# Patient Record
Sex: FEMALE | Race: BLACK OR AFRICAN AMERICAN | NOT HISPANIC OR LATINO | Employment: OTHER | URBAN - METROPOLITAN AREA
[De-identification: names, ages, dates, MRNs, and addresses within clinical notes are randomized per-mention and may not be internally consistent; named-entity substitution may affect disease eponyms.]

---

## 2024-03-11 ENCOUNTER — OFFICE VISIT (OUTPATIENT)
Dept: OBSTETRICS AND GYNECOLOGY | Facility: CLINIC | Age: 69
End: 2024-03-11
Payer: MEDICARE

## 2024-03-11 VITALS
HEIGHT: 69 IN | BODY MASS INDEX: 39.69 KG/M2 | DIASTOLIC BLOOD PRESSURE: 84 MMHG | SYSTOLIC BLOOD PRESSURE: 126 MMHG | WEIGHT: 268 LBS

## 2024-03-11 DIAGNOSIS — Z01.419 ENCOUNTER FOR ANNUAL ROUTINE GYNECOLOGICAL EXAMINATION: Primary | ICD-10-CM

## 2024-03-11 DIAGNOSIS — B37.31 YEAST VAGINITIS: ICD-10-CM

## 2024-03-11 DIAGNOSIS — Z12.31 BREAST CANCER SCREENING BY MAMMOGRAM: ICD-10-CM

## 2024-03-11 DIAGNOSIS — R21 SKIN RASH: ICD-10-CM

## 2024-03-11 DIAGNOSIS — Z12.4 PAP SMEAR FOR CERVICAL CANCER SCREENING: ICD-10-CM

## 2024-03-11 PROCEDURE — 88175 CYTOPATH C/V AUTO FLUID REDO: CPT | Performed by: OBSTETRICS & GYNECOLOGY

## 2024-03-11 PROCEDURE — G0101 CA SCREEN;PELVIC/BREAST EXAM: HCPCS | Mod: S$GLB,,, | Performed by: OBSTETRICS & GYNECOLOGY

## 2024-03-11 RX ORDER — SIMVASTATIN 40 MG/1
40 TABLET, FILM COATED ORAL NIGHTLY
COMMUNITY
Start: 2024-02-24

## 2024-03-11 RX ORDER — HYDROCHLOROTHIAZIDE 25 MG/1
25 TABLET ORAL
COMMUNITY
Start: 2023-12-04

## 2024-03-11 RX ORDER — LOSARTAN POTASSIUM 100 MG/1
100 TABLET ORAL
COMMUNITY
Start: 2023-12-04

## 2024-03-11 RX ORDER — CLOTRIMAZOLE AND BETAMETHASONE DIPROPIONATE 10; .64 MG/G; MG/G
CREAM TOPICAL 2 TIMES DAILY
Qty: 45 G | Refills: 2 | Status: SHIPPED | OUTPATIENT
Start: 2024-03-11

## 2024-03-11 RX ORDER — FLUCONAZOLE 150 MG/1
150 TABLET ORAL DAILY PRN
Qty: 3 TABLET | Refills: 3 | Status: SHIPPED | OUTPATIENT
Start: 2024-03-11 | End: 2024-03-14

## 2024-03-11 NOTE — PROGRESS NOTES
"Medicare Breast and Pelvic    HPI:  Viola Clark is a 68 y.o. female  presents for a well woman exam.  LMP: No LMP recorded. Patient is postmenopausal..   She complains of an itchy rash under her breasts and in her groins and under her pannus  She denies any bleeding    Past Medical History:   Diagnosis Date    Hyperlipidemia     Hypertension     Unspecified glaucoma      Past Surgical History:   Procedure Laterality Date    TOTAL KNEE REPLACEMENT USING COMPUTER NAVIGATION Right      Social History     Socioeconomic History    Marital status: Single   Tobacco Use    Smoking status: Never    Smokeless tobacco: Never   Substance and Sexual Activity    Alcohol use: Yes     Comment: occ    Sexual activity: Not Currently     Family History   Problem Relation Age of Onset    Colon cancer Mother     Breast cancer Neg Hx     Ovarian cancer Neg Hx     Uterine cancer Neg Hx      OB History          0    Para   0    Term   0       0    AB   0    Living   0         SAB   0    IAB   0    Ectopic   0    Multiple   0    Live Births   0                 /84 (BP Location: Right arm, Patient Position: Sitting)   Ht 5' 9" (1.753 m)   Wt 121.6 kg (268 lb)   BMI 39.58 kg/m²     ROS:  GENERAL: Denies weight gain or weight loss. Feeling well overall.   SKIN:  See HPI   HEAD: Denies head injury or headache.   NODES: Denies enlarged lymph nodes.   CHEST: Denies chest pain or shortness of breath.   CARDIOVASCULAR: Denies palpitations or left sided chest pain.   ABDOMEN: No abdominal pain, constipation, diarrhea, nausea, vomiting or rectal bleeding.   URINARY: No frequency, dysuria, hematuria, or burning on urination.  REPRODUCTIVE: See HPI.   BREASTS: The patient performs breast self-examination and denies pain, lumps, or nipple discharge.   HEMATOLOGIC: No easy bruisability or excessive bleeding.   MUSCULOSKELETAL: Denies joint pain or swelling.   NEUROLOGIC: Denies syncope or weakness. "   PSYCHIATRIC: Denies depression, anxiety or mood swings.    PHYSICAL EXAM:    APPEARANCE: Well nourished, well developed, in no acute distress.  AFFECT: WNL, alert and oriented x 3  SKIN:  Fungal rash under the breasts and in the groins  NECK: Neck symmetric without masses or thyromegaly  NODES: No inguinal, cervical, axillary, or femoral lymph node enlargement  CHEST: Good respiratory effect  ABDOMEN: Soft.  No tenderness or masses.  No hepatosplenomegaly.  No hernias.  BREASTS: Symmetrical, no skin changes or visible lesions.  No palpable masses, nipple discharge bilaterally.  PELVIC:     Pelvic  Vv normal mucosa,  small amount of discharge consistent with yeast  Cervix clear  Uterus anteverted normal size  Adnexa normal, without masses  Exam limited by habitus      ICD-10-CM ICD-9-CM    1. Encounter for annual routine gynecological examination  Z01.419 V72.31       2. Pap smear for cervical cancer screening  Z12.4 V76.2 Liquid-Based Pap Smear, Screening      Liquid-Based Pap Smear, Screening      3. Breast cancer screening by mammogram  Z12.31 V76.12 Mammo Digital Screening Bilat w/ Payam      Mammo Digital Screening Bilat w/ Payam          Assessment and plan:  1. Pap done  2. Mammogram ordered  3. Colonoscopy is due.  She will contact her GI physician  4. Lotrisone and Diflucan for fungal rashes and yeast vaginitis

## 2024-03-15 LAB
FINAL PATHOLOGIC DIAGNOSIS: NORMAL
Lab: NORMAL

## 2024-03-21 ENCOUNTER — TELEPHONE (OUTPATIENT)
Dept: OBSTETRICS AND GYNECOLOGY | Facility: CLINIC | Age: 69
End: 2024-03-21
Payer: COMMERCIAL

## 2024-03-21 NOTE — TELEPHONE ENCOUNTER
----- Message from Maria sEther Patel sent at 3/21/2024  3:44 PM CDT -----  Contact: PT  Type:  Test Results    Who Called:  PT  Name of Test (Lab/Mammo/Etc):  Pap  Date of Test:  3/11/24  Ordering Provider:  Dr. Gilman  Where the test was performed:  [facility]  Best Call Back Number:  [phone]  Additional Information:  Also have questions regarding what she can take for  vaginal dryness due to yeast infection     
MED/SURG

## 2024-03-25 ENCOUNTER — TELEPHONE (OUTPATIENT)
Dept: OBSTETRICS AND GYNECOLOGY | Facility: CLINIC | Age: 69
End: 2024-03-25
Payer: COMMERCIAL

## 2024-03-25 DIAGNOSIS — N89.8 VAGINAL DRYNESS: Primary | ICD-10-CM

## 2024-03-25 RX ORDER — ESTRADIOL 0.1 MG/G
1 CREAM VAGINAL
Qty: 24 G | Refills: 3 | Status: SHIPPED | OUTPATIENT
Start: 2024-03-25 | End: 2025-03-25

## 2025-02-03 ENCOUNTER — OFFICE VISIT (OUTPATIENT)
Dept: PODIATRY | Facility: CLINIC | Age: 70
End: 2025-02-03
Payer: MEDICARE

## 2025-02-03 VITALS — BODY MASS INDEX: 38.48 KG/M2 | WEIGHT: 259.81 LBS | HEIGHT: 69 IN

## 2025-02-03 DIAGNOSIS — L60.9 DISEASE OF NAIL: ICD-10-CM

## 2025-02-03 DIAGNOSIS — E11.51 TYPE II DIABETES MELLITUS WITH PERIPHERAL CIRCULATORY DISORDER: ICD-10-CM

## 2025-02-03 DIAGNOSIS — M20.41 HAMMER TOES OF BOTH FEET: ICD-10-CM

## 2025-02-03 DIAGNOSIS — M20.11 VALGUS DEFORMITY OF BOTH GREAT TOES: Primary | ICD-10-CM

## 2025-02-03 DIAGNOSIS — E11.9 COMPREHENSIVE DIABETIC FOOT EXAMINATION, TYPE 2 DM, ENCOUNTER FOR: ICD-10-CM

## 2025-02-03 DIAGNOSIS — M20.12 VALGUS DEFORMITY OF BOTH GREAT TOES: Primary | ICD-10-CM

## 2025-02-03 DIAGNOSIS — L84 CORN OR CALLUS: ICD-10-CM

## 2025-02-03 DIAGNOSIS — M20.42 HAMMER TOES OF BOTH FEET: ICD-10-CM

## 2025-02-03 PROCEDURE — 1160F RVW MEDS BY RX/DR IN RCRD: CPT | Mod: CPTII,S$GLB,, | Performed by: PODIATRIST

## 2025-02-03 PROCEDURE — 11721 DEBRIDE NAIL 6 OR MORE: CPT | Mod: 59,Q9,S$GLB, | Performed by: PODIATRIST

## 2025-02-03 PROCEDURE — 1125F AMNT PAIN NOTED PAIN PRSNT: CPT | Mod: CPTII,S$GLB,, | Performed by: PODIATRIST

## 2025-02-03 PROCEDURE — 1159F MED LIST DOCD IN RCRD: CPT | Mod: CPTII,S$GLB,, | Performed by: PODIATRIST

## 2025-02-03 PROCEDURE — 11056 PARNG/CUTG B9 HYPRKR LES 2-4: CPT | Mod: Q9,S$GLB,, | Performed by: PODIATRIST

## 2025-02-03 PROCEDURE — 3008F BODY MASS INDEX DOCD: CPT | Mod: CPTII,S$GLB,, | Performed by: PODIATRIST

## 2025-02-03 PROCEDURE — 99204 OFFICE O/P NEW MOD 45 MIN: CPT | Mod: 25,S$GLB,, | Performed by: PODIATRIST

## 2025-02-03 RX ORDER — ALLOPURINOL 100 MG/1
100 TABLET ORAL
COMMUNITY
Start: 2025-01-23

## 2025-02-03 RX ORDER — BIMATOPROST 0.1 MG/ML
1 SOLUTION/ DROPS OPHTHALMIC
COMMUNITY
Start: 2024-12-31

## 2025-02-03 RX ORDER — NYSTATIN 100000 [USP'U]/G
POWDER TOPICAL
COMMUNITY
Start: 2024-09-17 | End: 2025-09-17

## 2025-02-03 RX ORDER — METFORMIN HYDROCHLORIDE 500 MG/1
500 TABLET, EXTENDED RELEASE ORAL
COMMUNITY
Start: 2024-12-22

## 2025-02-03 RX ORDER — COLCHICINE 0.6 MG/1
TABLET ORAL
COMMUNITY
Start: 2025-01-23

## 2025-02-03 RX ORDER — ERYTHROMYCIN 5 MG/G
OINTMENT OPHTHALMIC
COMMUNITY
Start: 2025-02-01

## 2025-02-03 NOTE — PROGRESS NOTES
"Subjective:     Patient ID: Viola Clark is a 69 y.o. female    Chief Complaint: Foot Pain       Viola is a 69 y.o. female who presents to the clinic for evaluation and treatment of high risk feet. Viola has a past medical history of Hyperlipidemia, Hypertension, and Unspecified glaucoma. The patient's chief complaint is long, thick toenails. This patient has documented high risk feet requiring routine maintenance secondary to diabetes mellitis and those secondary complications of diabetes, as mentioned..  She also complains of tenderness from the right 1st and 2nd digit with noted callus formations.    PCP: April Priest MD    Date Last Seen by PCP:  12/11/2024    Current shoe gear:  Affected Foot: Tennis shoes     Unaffected Foot: Tennis shoes    No results found for: "HGBA1C"    Review of Systems   Constitutional: Negative.  Negative for chills and fever.   Respiratory:  Negative for cough and shortness of breath.    Cardiovascular:  Positive for leg swelling. Negative for chest pain.   Gastrointestinal:  Negative for diarrhea, nausea and vomiting.   Neurological:  Positive for tingling.        Objective:   Physical Exam  Vitals reviewed.   Constitutional:       General: She is not in acute distress.     Appearance: Normal appearance. She is not ill-appearing.   HENT:      Head: Normocephalic.      Nose: Nose normal.   Cardiovascular:      Pulses:           Dorsalis pedis pulses are 2+ on the right side and 2+ on the left side.        Posterior tibial pulses are 1+ on the right side and 1+ on the left side.   Pulmonary:      Effort: Pulmonary effort is normal. No respiratory distress.   Musculoskeletal:      Right foot: Bunion present.      Left foot: Bunion present.   Feet:      Right foot:      Skin integrity: Callus present.      Left foot:      Skin integrity: Callus present.   Skin:     Capillary Refill: Capillary refill takes 2 to 3 seconds.   Neurological:      Mental Status: She " is alert and oriented to person, place, and time.   Psychiatric:         Mood and Affect: Mood normal.         Behavior: Behavior normal.         Thought Content: Thought content normal.         Judgment: Judgment normal.        Foot Exam    General  General Appearance: appears stated age and healthy   Orientation: alert and oriented to person, place, and time   Affect: appropriate   Gait: unimpaired       Right Foot/Ankle     Inspection and Palpation  Tenderness: (Medial aspect right 2nd digit and lateral aspect right 1st digit)  Swelling: dorsum   Hammertoes: second toe  Hallux valgus: yes  Hallux limitus: yes  Skin Exam: callus;     Neurovascular  Dorsalis pedis: 2+  Posterior tibial: 1+    Muscle Strength  Ankle dorsiflexion: 5  Ankle plantar flexion: 5  Ankle inversion: 5  Ankle eversion: 5  Great toe extension: 5  Great toe flexion: 5      Left Foot/Ankle      Inspection and Palpation  Swelling: dorsum   Hammertoes: second toe  Hallux valgus: yes  Hallux limitus: yes  Skin Exam: callus;     Neurovascular  Dorsalis pedis: 2+  Posterior tibial: 1+    Muscle Strength  Ankle dorsiflexion: 5  Ankle plantar flexion: 5  Ankle inversion: 5  Ankle eversion: 5  Great toe extension: 5  Great toe flexion: 5      Dermatologic: Nails 1 through 5 bilateral are thickened elongated discolored with subungual debris, atrophic soft tissue skin changes noted including hyperpigmentation mild telangiectasias at the medial ankle   Vascular: +1 nonpitting edema noted bilateral ankle, pedal hair growth is absent bilateral lower extremity, skin temperature gradient warm to cool from proximal distal bilateral lower extremity   Neurologic: Positive paresthesias reported     Assessment:         1. Valgus deformity of both great toes    2. Hammer toes of both feet    3. Corn or callus    4. Disease of nail    5. Comprehensive diabetic foot examination, type 2 DM, encounter for    6. Type II diabetes mellitus with peripheral circulatory  disorder       Plan:     Viola Clark was seen today for   Chief Complaint   Patient presents with    Foot Pain       Assessment & Plan           Routine Foot Care    Date/Time: 2/3/2025 9:45 AM    Performed by: Prema Nur DPM  Authorized by: Prema Nur DPM    Consent Done?:  Yes (Verbal)  Hyperkeratotic Skin Lesions?: Yes    Number of trimmed lesions:  3  Location(s):  Right 1st Toe, Right 2nd Toe and Left 1st Metatarsal Head    Nail Care Type:  Debride  Location(s): All  (Left 1st Toe, Left 3rd Toe, Left 2nd Toe, Left 4th Toe, Left 5th Toe, Right 1st Toe, Right 2nd Toe, Right 3rd Toe, Right 4th Toe and Right 5th Toe)  Patient tolerance:  Patient tolerated the procedure well with no immediate complications       1. Patient was examined and evaluated.    2. Patient made aware that right foot pain may not be related to actual gout process but may be contributed by presence of bunion in the area.  Patient will monitor for gout complaints and continue with prophylactic treatment with allopurinol.  Discussed with patient etiology of bunion deformity/hallux abductovalgus.  Discussed conservative treatment options with the patient.  Patient was advised to adjust shoe gear for better comfort and fit including increased toe box height and width and selection shoe gear with soft stretchable uppers.  Discussed with patient potential benefits of a local corticosteroid injection or oral Medrol Dosepak for improvement of pain and inflammation.  Patient was advised that she can adjunct with NSAIDs for pain relief as well.  Briefly discussed with patient surgical intervention including Lorenzo bunionectomy.    3. Patient was advised of the etiology of hammertoe deformity.  Patient was advised to adjust shoe gear for better comfort and fit including increased toe box height and width and selection shoe gear with soft stretchable uppers.  Briefly discussed with patient surgical intervention for  correction of hammertoes including arthroplasty and arthrodesis or possible cheilectomy.  Patient was dispensed gel toe cushion for prevention of rub of adjacent right 1st and 2nd digits  4. Discussed with patient etiology of callus formation.  Patient was warned of potential recurrence over time.  Patient was dispensed offloading pads for reduction of direct contact to the lesion.  Patient was advised to perform safe debridement at home with a emery board, nail file or pumice stone.  Patient was advised to apply ointments / moisturizer to the area for softening purposes.  5. Discussed with patient the etiology of nail fungus and as possible secondary issue to prior nail trauma.  Discussed with patient OTC topical conservative treatments such as Vicks vapor rub, tea tree oil as well as coconut oil.  Discussed with patient risks and benefits oral Lamisil therapy.   6. Patient was advised to continue with daily foot monitoring.  Patient will continue efforts proper glycemic control, lowering hemoglobin A1c, adherence to diabetic medication regimen  7. Patient will follow-up in 3-4 months or p.r.n. for complaints      Camden Nur DPM  [address] [place] [address]  [phone]      This note was created using [employer] direct voice recognition software. Note may have occasional typographical errors that may not have been identified and edited despite initial review prior to signing.